# Patient Record
Sex: FEMALE | Race: WHITE | NOT HISPANIC OR LATINO | URBAN - METROPOLITAN AREA
[De-identification: names, ages, dates, MRNs, and addresses within clinical notes are randomized per-mention and may not be internally consistent; named-entity substitution may affect disease eponyms.]

---

## 2017-11-25 ENCOUNTER — INPATIENT (INPATIENT)
Facility: HOSPITAL | Age: 22
LOS: 1 days | Discharge: HOME | End: 2017-11-27
Attending: OBSTETRICS & GYNECOLOGY | Admitting: OBSTETRICS & GYNECOLOGY
Payer: COMMERCIAL

## 2017-11-25 VITALS
OXYGEN SATURATION: 100 % | TEMPERATURE: 98 F | DIASTOLIC BLOOD PRESSURE: 54 MMHG | HEART RATE: 79 BPM | SYSTOLIC BLOOD PRESSURE: 117 MMHG | RESPIRATION RATE: 16 BRPM

## 2017-11-25 DIAGNOSIS — Z3A.00 WEEKS OF GESTATION OF PREGNANCY NOT SPECIFIED: ICD-10-CM

## 2017-11-25 DIAGNOSIS — O26.899 OTHER SPECIFIED PREGNANCY RELATED CONDITIONS, UNSPECIFIED TRIMESTER: ICD-10-CM

## 2017-11-25 DIAGNOSIS — Z34.80 ENCOUNTER FOR SUPERVISION OF OTHER NORMAL PREGNANCY, UNSPECIFIED TRIMESTER: ICD-10-CM

## 2017-11-25 LAB
BASOPHILS # BLD AUTO: 0 K/UL — SIGNIFICANT CHANGE UP (ref 0–0.2)
BASOPHILS NFR BLD AUTO: 0.2 % — SIGNIFICANT CHANGE UP (ref 0–2)
EOSINOPHIL # BLD AUTO: 0 K/UL — SIGNIFICANT CHANGE UP (ref 0–0.5)
EOSINOPHIL NFR BLD AUTO: 0.3 % — SIGNIFICANT CHANGE UP (ref 0–6)
HCT VFR BLD CALC: 33.3 % — LOW (ref 34.5–45)
HGB BLD-MCNC: 11.5 G/DL — SIGNIFICANT CHANGE UP (ref 11.5–15.5)
LYMPHOCYTES # BLD AUTO: 15.2 % — SIGNIFICANT CHANGE UP (ref 13–44)
LYMPHOCYTES # BLD AUTO: 2.5 K/UL — SIGNIFICANT CHANGE UP (ref 1–3.3)
MCHC RBC-ENTMCNC: 33.1 PG — SIGNIFICANT CHANGE UP (ref 27–34)
MCHC RBC-ENTMCNC: 34.5 GM/DL — SIGNIFICANT CHANGE UP (ref 32–36)
MCV RBC AUTO: 96 FL — SIGNIFICANT CHANGE UP (ref 80–100)
MONOCYTES # BLD AUTO: 0.8 K/UL — SIGNIFICANT CHANGE UP (ref 0–0.9)
MONOCYTES NFR BLD AUTO: 5.2 % — SIGNIFICANT CHANGE UP (ref 2–14)
NEUTROPHILS # BLD AUTO: 13 K/UL — HIGH (ref 1.8–7.4)
NEUTROPHILS NFR BLD AUTO: 79.2 % — HIGH (ref 43–77)
PLATELET # BLD AUTO: 244 K/UL — SIGNIFICANT CHANGE UP (ref 150–400)
RBC # BLD: 3.47 M/UL — LOW (ref 3.8–5.2)
RBC # FLD: 12 % — SIGNIFICANT CHANGE UP (ref 10.3–14.5)
T PALLIDUM AB TITR SER: NEGATIVE — SIGNIFICANT CHANGE UP
WBC # BLD: 16.5 K/UL — HIGH (ref 3.8–10.5)
WBC # FLD AUTO: 16.5 K/UL — HIGH (ref 3.8–10.5)

## 2017-11-25 RX ORDER — SODIUM CHLORIDE 9 MG/ML
1000 INJECTION, SOLUTION INTRAVENOUS
Qty: 0 | Refills: 0 | Status: DISCONTINUED | OUTPATIENT
Start: 2017-11-25 | End: 2017-11-25

## 2017-11-25 RX ORDER — IBUPROFEN 200 MG
600 TABLET ORAL EVERY 6 HOURS
Qty: 0 | Refills: 0 | Status: COMPLETED | OUTPATIENT
Start: 2017-11-25 | End: 2018-10-24

## 2017-11-25 RX ORDER — SIMETHICONE 80 MG/1
80 TABLET, CHEWABLE ORAL EVERY 6 HOURS
Qty: 0 | Refills: 0 | Status: DISCONTINUED | OUTPATIENT
Start: 2017-11-25 | End: 2017-11-27

## 2017-11-25 RX ORDER — ACETAMINOPHEN 500 MG
975 TABLET ORAL EVERY 6 HOURS
Qty: 0 | Refills: 0 | Status: DISCONTINUED | OUTPATIENT
Start: 2017-11-25 | End: 2017-11-27

## 2017-11-25 RX ORDER — PRAMOXINE HYDROCHLORIDE 150 MG/15G
1 AEROSOL, FOAM RECTAL EVERY 4 HOURS
Qty: 0 | Refills: 0 | Status: DISCONTINUED | OUTPATIENT
Start: 2017-11-25 | End: 2017-11-25

## 2017-11-25 RX ORDER — DIPHENHYDRAMINE HCL 50 MG
25 CAPSULE ORAL EVERY 6 HOURS
Qty: 0 | Refills: 0 | Status: DISCONTINUED | OUTPATIENT
Start: 2017-11-25 | End: 2017-11-27

## 2017-11-25 RX ORDER — CITRIC ACID/SODIUM CITRATE 300-500 MG
15 SOLUTION, ORAL ORAL EVERY 4 HOURS
Qty: 0 | Refills: 0 | Status: DISCONTINUED | OUTPATIENT
Start: 2017-11-25 | End: 2017-11-25

## 2017-11-25 RX ORDER — DIBUCAINE 1 %
1 OINTMENT (GRAM) RECTAL EVERY 4 HOURS
Qty: 0 | Refills: 0 | Status: DISCONTINUED | OUTPATIENT
Start: 2017-11-25 | End: 2017-11-25

## 2017-11-25 RX ORDER — SODIUM CHLORIDE 9 MG/ML
1000 INJECTION, SOLUTION INTRAVENOUS ONCE
Qty: 0 | Refills: 0 | Status: DISCONTINUED | OUTPATIENT
Start: 2017-11-25 | End: 2017-11-25

## 2017-11-25 RX ORDER — SODIUM CHLORIDE 9 MG/ML
3 INJECTION INTRAMUSCULAR; INTRAVENOUS; SUBCUTANEOUS EVERY 8 HOURS
Qty: 0 | Refills: 0 | Status: DISCONTINUED | OUTPATIENT
Start: 2017-11-25 | End: 2017-11-27

## 2017-11-25 RX ORDER — OXYTOCIN 10 UNIT/ML
333.33 VIAL (ML) INJECTION
Qty: 20 | Refills: 0 | Status: DISCONTINUED | OUTPATIENT
Start: 2017-11-25 | End: 2017-11-25

## 2017-11-25 RX ORDER — SODIUM CHLORIDE 9 MG/ML
3 INJECTION INTRAMUSCULAR; INTRAVENOUS; SUBCUTANEOUS EVERY 8 HOURS
Qty: 0 | Refills: 0 | Status: DISCONTINUED | OUTPATIENT
Start: 2017-11-25 | End: 2017-11-25

## 2017-11-25 RX ORDER — OXYTOCIN 10 UNIT/ML
41.67 VIAL (ML) INJECTION
Qty: 20 | Refills: 0 | Status: DISCONTINUED | OUTPATIENT
Start: 2017-11-25 | End: 2017-11-25

## 2017-11-25 RX ORDER — LANOLIN
1 OINTMENT (GRAM) TOPICAL EVERY 6 HOURS
Qty: 0 | Refills: 0 | Status: DISCONTINUED | OUTPATIENT
Start: 2017-11-25 | End: 2017-11-27

## 2017-11-25 RX ORDER — OXYCODONE HYDROCHLORIDE 5 MG/1
5 TABLET ORAL EVERY 4 HOURS
Qty: 0 | Refills: 0 | Status: DISCONTINUED | OUTPATIENT
Start: 2017-11-25 | End: 2017-11-27

## 2017-11-25 RX ORDER — PRAMOXINE HYDROCHLORIDE 150 MG/15G
1 AEROSOL, FOAM RECTAL EVERY 4 HOURS
Qty: 0 | Refills: 0 | Status: DISCONTINUED | OUTPATIENT
Start: 2017-11-25 | End: 2017-11-27

## 2017-11-25 RX ORDER — AER TRAVELER 0.5 G/1
1 SOLUTION RECTAL; TOPICAL EVERY 4 HOURS
Qty: 0 | Refills: 0 | Status: DISCONTINUED | OUTPATIENT
Start: 2017-11-25 | End: 2017-11-25

## 2017-11-25 RX ORDER — IBUPROFEN 200 MG
600 TABLET ORAL EVERY 6 HOURS
Qty: 0 | Refills: 0 | Status: DISCONTINUED | OUTPATIENT
Start: 2017-11-25 | End: 2017-11-27

## 2017-11-25 RX ORDER — HYDROCORTISONE 1 %
1 OINTMENT (GRAM) TOPICAL EVERY 4 HOURS
Qty: 0 | Refills: 0 | Status: DISCONTINUED | OUTPATIENT
Start: 2017-11-25 | End: 2017-11-25

## 2017-11-25 RX ORDER — OXYTOCIN 10 UNIT/ML
41.67 VIAL (ML) INJECTION
Qty: 20 | Refills: 0 | Status: DISCONTINUED | OUTPATIENT
Start: 2017-11-25 | End: 2017-11-27

## 2017-11-25 RX ORDER — OXYCODONE HYDROCHLORIDE 5 MG/1
5 TABLET ORAL
Qty: 0 | Refills: 0 | Status: DISCONTINUED | OUTPATIENT
Start: 2017-11-25 | End: 2017-11-27

## 2017-11-25 RX ORDER — TETANUS TOXOID, REDUCED DIPHTHERIA TOXOID AND ACELLULAR PERTUSSIS VACCINE, ADSORBED 5; 2.5; 8; 8; 2.5 [IU]/.5ML; [IU]/.5ML; UG/.5ML; UG/.5ML; UG/.5ML
0.5 SUSPENSION INTRAMUSCULAR ONCE
Qty: 0 | Refills: 0 | Status: DISCONTINUED | OUTPATIENT
Start: 2017-11-25 | End: 2017-11-27

## 2017-11-25 RX ORDER — MAGNESIUM HYDROXIDE 400 MG/1
30 TABLET, CHEWABLE ORAL
Qty: 0 | Refills: 0 | Status: DISCONTINUED | OUTPATIENT
Start: 2017-11-25 | End: 2017-11-27

## 2017-11-25 RX ORDER — AER TRAVELER 0.5 G/1
1 SOLUTION RECTAL; TOPICAL EVERY 4 HOURS
Qty: 0 | Refills: 0 | Status: DISCONTINUED | OUTPATIENT
Start: 2017-11-25 | End: 2017-11-27

## 2017-11-25 RX ORDER — KETOROLAC TROMETHAMINE 30 MG/ML
30 SYRINGE (ML) INJECTION ONCE
Qty: 0 | Refills: 0 | Status: DISCONTINUED | OUTPATIENT
Start: 2017-11-25 | End: 2017-11-25

## 2017-11-25 RX ORDER — HYDROCORTISONE 1 %
1 OINTMENT (GRAM) TOPICAL EVERY 4 HOURS
Qty: 0 | Refills: 0 | Status: DISCONTINUED | OUTPATIENT
Start: 2017-11-25 | End: 2017-11-27

## 2017-11-25 RX ORDER — GLYCERIN ADULT
1 SUPPOSITORY, RECTAL RECTAL AT BEDTIME
Qty: 0 | Refills: 0 | Status: DISCONTINUED | OUTPATIENT
Start: 2017-11-25 | End: 2017-11-27

## 2017-11-25 RX ORDER — ACETAMINOPHEN 500 MG
975 TABLET ORAL EVERY 6 HOURS
Qty: 0 | Refills: 0 | Status: COMPLETED | OUTPATIENT
Start: 2017-11-25 | End: 2018-10-24

## 2017-11-25 RX ORDER — DOCUSATE SODIUM 100 MG
100 CAPSULE ORAL
Qty: 0 | Refills: 0 | Status: DISCONTINUED | OUTPATIENT
Start: 2017-11-25 | End: 2017-11-27

## 2017-11-25 RX ORDER — DIBUCAINE 1 %
1 OINTMENT (GRAM) RECTAL EVERY 4 HOURS
Qty: 0 | Refills: 0 | Status: DISCONTINUED | OUTPATIENT
Start: 2017-11-25 | End: 2017-11-27

## 2017-11-25 RX ADMIN — Medication 30 MILLIGRAM(S): at 13:55

## 2017-11-25 RX ADMIN — Medication 125 MILLIUNIT(S)/MIN: at 13:57

## 2017-11-26 ENCOUNTER — TRANSCRIPTION ENCOUNTER (OUTPATIENT)
Age: 22
End: 2017-11-26

## 2017-11-26 LAB
HCT VFR BLD CALC: 30.2 % — LOW (ref 34.5–45)
HGB BLD-MCNC: 9.6 G/DL — LOW (ref 11.5–15.5)

## 2017-11-26 RX ORDER — DOCUSATE SODIUM 100 MG
1 CAPSULE ORAL
Qty: 0 | Refills: 0 | DISCHARGE
Start: 2017-11-26

## 2017-11-26 RX ORDER — ACETAMINOPHEN 500 MG
3 TABLET ORAL
Qty: 0 | Refills: 0 | COMMUNITY
Start: 2017-11-26

## 2017-11-26 RX ORDER — IBUPROFEN 200 MG
1 TABLET ORAL
Qty: 0 | Refills: 0 | COMMUNITY
Start: 2017-11-26

## 2017-11-26 RX ADMIN — Medication 600 MILLIGRAM(S): at 04:10

## 2017-11-26 RX ADMIN — Medication 600 MILLIGRAM(S): at 23:01

## 2017-11-26 RX ADMIN — Medication 600 MILLIGRAM(S): at 16:56

## 2017-11-26 RX ADMIN — Medication 600 MILLIGRAM(S): at 09:30

## 2017-11-26 RX ADMIN — Medication 600 MILLIGRAM(S): at 03:10

## 2017-11-26 RX ADMIN — Medication 1 TABLET(S): at 11:24

## 2017-11-26 RX ADMIN — Medication 600 MILLIGRAM(S): at 08:49

## 2017-11-26 RX ADMIN — Medication 600 MILLIGRAM(S): at 17:30

## 2017-11-26 RX ADMIN — Medication 600 MILLIGRAM(S): at 22:31

## 2017-11-26 RX ADMIN — Medication 100 MILLIGRAM(S): at 22:33

## 2017-11-26 NOTE — DISCHARGE NOTE OB - PATIENT PORTAL LINK FT
“You can access the FollowHealth Patient Portal, offered by [facility], by registering with the following website: [email or web address]”

## 2017-11-26 NOTE — DISCHARGE NOTE OB - MEDICATION SUMMARY - MEDICATIONS TO TAKE
I will START or STAY ON the medications listed below when I get home from the hospital:    acetaminophen 325 mg oral tablet  -- 3 tab(s) by mouth every 6 hours  -- Indication: For pain    ibuprofen 600 mg oral tablet  -- 1 tab(s) by mouth every 6 hours  -- Indication: For pain    docusate sodium 100 mg oral capsule  -- 1 cap(s) by mouth 2 times a day, As needed, Stool Softening  -- Indication: For constipation

## 2017-11-26 NOTE — PROGRESS NOTE ADULT - SUBJECTIVE AND OBJECTIVE BOX
Postpartum Note- PPD#1    Allergies    No Known Allergies    Intolerances      Rubella Immune  RPR Negative  Blood Type  B  --  Negative    Patient w/o complaints, pain is controlled.    Pt is OOB, tolerating PO, passing flatus. Lochia WNL.     O:  Vital Signs Last 24 Hrs  T(C): 36.7 (26 Nov 2017 05:32), Max: 36.9 (25 Nov 2017 13:30)  T(F): 98 (26 Nov 2017 05:32), Max: 98.4 (25 Nov 2017 13:30)  HR: 64 (26 Nov 2017 05:32) (64 - 84)  BP: 99/64 (26 Nov 2017 05:32) (94/66 - 117/54)  BP(mean): --  RR: 18 (26 Nov 2017 05:32) (15 - 20)  SpO2: 98% (25 Nov 2017 17:20) (98% - 100%)     Gen: NAD  Abdomen: Soft, nontender, non-distended, fundus firm.  Lochia WNL  Ext: Neg edema, Neg calf tenderness    LABS:               9.6    x     )-----------( x        ( 11-26 @ 06:26 )             30.2                11.5   16.5  )-----------( 244      ( 11-25 @ 14:40 )             33.3

## 2017-11-26 NOTE — DISCHARGE NOTE OB - MEDICATION SUMMARY - MEDICATIONS TO STOP TAKING
I will STOP taking the medications listed below when I get home from the hospital:    acetaminophen 325 mg oral tablet  -- 3 tab(s) by mouth every 6 hours    ibuprofen 600 mg oral tablet  -- 1 tab(s) by mouth every 6 hours    oxyCODONE 5 mg oral tablet  -- 1 tab(s) by mouth every 3 hours    oxyCODONE 5 mg oral tablet  -- 1 tab(s) by mouth every 4 hours, As needed, Severe Pain

## 2017-11-26 NOTE — DISCHARGE NOTE OB - CARE PLAN
Principal Discharge DX:	Vaginal delivery  Goal:	recovery  Instructions for follow-up, activity and diet:	Follow up in the office in 6 weeks for your pp visit.

## 2017-11-26 NOTE — DISCHARGE NOTE OB - MATERIALS PROVIDED
Breastfeeding Guide and Packet/Breastfeeding Mother’s Support Group Information/[facility]  Screening Program/Shaken Baby Prevention Handout/Birth Certificate Instructions/[facility] Hearing Screen Program/Guide to Postpartum Care/Back To Sleep Handout/Breastfeeding Log/Discharge Medication Information for Patients and Families Pocket Guide

## 2017-11-26 NOTE — DISCHARGE NOTE OB - CARE PROVIDER_API CALL
Arnold Smith), Obstetrics and Gynecology  [address] [place] [address]  Phone: [phone]  Fax: [phone]

## 2017-11-27 VITALS
SYSTOLIC BLOOD PRESSURE: 89 MMHG | HEART RATE: 70 BPM | DIASTOLIC BLOOD PRESSURE: 58 MMHG | TEMPERATURE: 98 F | RESPIRATION RATE: 18 BRPM

## 2017-11-27 PROCEDURE — 59025 FETAL NON-STRESS TEST: CPT

## 2017-11-27 PROCEDURE — G0463: CPT

## 2017-11-27 PROCEDURE — 59050 FETAL MONITOR W/REPORT: CPT

## 2017-11-27 PROCEDURE — 86901 BLOOD TYPING SEROLOGIC RH(D): CPT

## 2017-11-27 PROCEDURE — 86900 BLOOD TYPING SEROLOGIC ABO: CPT

## 2017-11-27 PROCEDURE — 85027 COMPLETE CBC AUTOMATED: CPT

## 2017-11-27 PROCEDURE — 85018 HEMOGLOBIN: CPT

## 2017-11-27 PROCEDURE — 86850 RBC ANTIBODY SCREEN: CPT

## 2017-11-27 PROCEDURE — 86780 TREPONEMA PALLIDUM: CPT

## 2017-11-27 RX ADMIN — Medication 600 MILLIGRAM(S): at 11:47

## 2017-11-27 RX ADMIN — Medication 600 MILLIGRAM(S): at 11:17

## 2017-11-27 RX ADMIN — Medication 600 MILLIGRAM(S): at 05:22

## 2017-11-27 RX ADMIN — Medication 975 MILLIGRAM(S): at 09:15

## 2017-11-27 RX ADMIN — Medication 975 MILLIGRAM(S): at 01:52

## 2017-11-27 RX ADMIN — Medication 975 MILLIGRAM(S): at 08:45

## 2017-11-27 RX ADMIN — Medication 975 MILLIGRAM(S): at 01:22

## 2017-11-27 NOTE — PROGRESS NOTE ADULT - SUBJECTIVE AND OBJECTIVE BOX
PPD#2- ATTENDING NOTE    S: Patient doing well. Minimal lochia. Pain controlled.    O: Vital Signs Last 24 Hrs  T(C): 36.7 (27 Nov 2017 06:30), Max: 36.7 (26 Nov 2017 17:33)  T(F): 98 (27 Nov 2017 06:30), Max: 98 (26 Nov 2017 17:33)  HR: 70 (27 Nov 2017 06:30) (54 - 81)  BP: 89/58 (27 Nov 2017 06:30) (89/57 - 90/60)  BP(mean): --  RR: 18 (27 Nov 2017 06:30) (18 - 18)  SpO2: 98% (26 Nov 2017 17:33) (98% - 98%)    Gen: NAD  Abd: soft, NT, ND, fundus firm below umbilicus  Lochia: moderate  Ext: no tenderness, no hyper reflexia    Labs:                        9.6    x     )-----------( x        ( 26 Nov 2017 06:26 )             30.2

## 2019-07-19 PROBLEM — Z00.00 ENCOUNTER FOR PREVENTIVE HEALTH EXAMINATION: Status: ACTIVE | Noted: 2019-07-19

## 2019-07-22 ENCOUNTER — APPOINTMENT (OUTPATIENT)
Dept: ANTEPARTUM | Facility: CLINIC | Age: 24
End: 2019-07-22

## 2019-07-24 ENCOUNTER — APPOINTMENT (OUTPATIENT)
Dept: ANTEPARTUM | Facility: CLINIC | Age: 24
End: 2019-07-24

## 2019-07-24 ENCOUNTER — ASOB RESULT (OUTPATIENT)
Age: 24
End: 2019-07-24

## 2019-07-24 ENCOUNTER — TRANSCRIPTION ENCOUNTER (OUTPATIENT)
Age: 24
End: 2019-07-24

## 2019-07-24 ENCOUNTER — APPOINTMENT (OUTPATIENT)
Dept: ANTEPARTUM | Facility: CLINIC | Age: 24
End: 2019-07-24
Payer: COMMERCIAL

## 2019-07-24 PROCEDURE — 76805 OB US >/= 14 WKS SNGL FETUS: CPT

## 2019-09-26 ENCOUNTER — OUTPATIENT (OUTPATIENT)
Dept: OUTPATIENT SERVICES | Facility: HOSPITAL | Age: 24
LOS: 1 days | End: 2019-09-26
Payer: COMMERCIAL

## 2019-09-26 DIAGNOSIS — O26.899 OTHER SPECIFIED PREGNANCY RELATED CONDITIONS, UNSPECIFIED TRIMESTER: ICD-10-CM

## 2019-09-26 DIAGNOSIS — Z3A.00 WEEKS OF GESTATION OF PREGNANCY NOT SPECIFIED: ICD-10-CM

## 2019-09-26 PROCEDURE — 59025 FETAL NON-STRESS TEST: CPT | Mod: 26

## 2019-09-26 PROCEDURE — 59025 FETAL NON-STRESS TEST: CPT

## 2019-09-26 PROCEDURE — G0463: CPT

## 2019-11-22 ENCOUNTER — OUTPATIENT (OUTPATIENT)
Dept: OUTPATIENT SERVICES | Facility: HOSPITAL | Age: 24
LOS: 1 days | End: 2019-11-22
Payer: COMMERCIAL

## 2019-11-22 DIAGNOSIS — Z34.80 ENCOUNTER FOR SUPERVISION OF OTHER NORMAL PREGNANCY, UNSPECIFIED TRIMESTER: ICD-10-CM

## 2019-11-22 DIAGNOSIS — Z3A.00 WEEKS OF GESTATION OF PREGNANCY NOT SPECIFIED: ICD-10-CM

## 2019-11-22 DIAGNOSIS — O26.899 OTHER SPECIFIED PREGNANCY RELATED CONDITIONS, UNSPECIFIED TRIMESTER: ICD-10-CM

## 2019-11-22 LAB
ANTIBODY ID 1_1: SIGNIFICANT CHANGE UP
BASOPHILS # BLD AUTO: 0.01 K/UL — SIGNIFICANT CHANGE UP (ref 0–0.2)
BASOPHILS NFR BLD AUTO: 0.1 % — SIGNIFICANT CHANGE UP (ref 0–2)
BLD GP AB SCN SERPL QL: POSITIVE — SIGNIFICANT CHANGE UP
EOSINOPHIL # BLD AUTO: 0.04 K/UL — SIGNIFICANT CHANGE UP (ref 0–0.5)
EOSINOPHIL NFR BLD AUTO: 0.4 % — SIGNIFICANT CHANGE UP (ref 0–6)
HCT VFR BLD CALC: 36.6 % — SIGNIFICANT CHANGE UP (ref 34.5–45)
HGB BLD-MCNC: 11.6 G/DL — SIGNIFICANT CHANGE UP (ref 11.5–15.5)
IMM GRANULOCYTES NFR BLD AUTO: 0.3 % — SIGNIFICANT CHANGE UP (ref 0–1.5)
LYMPHOCYTES # BLD AUTO: 1.84 K/UL — SIGNIFICANT CHANGE UP (ref 1–3.3)
LYMPHOCYTES # BLD AUTO: 19.5 % — SIGNIFICANT CHANGE UP (ref 13–44)
MCHC RBC-ENTMCNC: 30.7 PG — SIGNIFICANT CHANGE UP (ref 27–34)
MCHC RBC-ENTMCNC: 31.7 GM/DL — LOW (ref 32–36)
MCV RBC AUTO: 96.8 FL — SIGNIFICANT CHANGE UP (ref 80–100)
MONOCYTES # BLD AUTO: 0.51 K/UL — SIGNIFICANT CHANGE UP (ref 0–0.9)
MONOCYTES NFR BLD AUTO: 5.4 % — SIGNIFICANT CHANGE UP (ref 2–14)
NEUTROPHILS # BLD AUTO: 6.99 K/UL — SIGNIFICANT CHANGE UP (ref 1.8–7.4)
NEUTROPHILS NFR BLD AUTO: 74.3 % — SIGNIFICANT CHANGE UP (ref 43–77)
NRBC # BLD: 0 /100 WBCS — SIGNIFICANT CHANGE UP (ref 0–0)
PLATELET # BLD AUTO: 229 K/UL — SIGNIFICANT CHANGE UP (ref 150–400)
RBC # BLD: 3.78 M/UL — LOW (ref 3.8–5.2)
RBC # FLD: 14 % — SIGNIFICANT CHANGE UP (ref 10.3–14.5)
RH IG SCN BLD-IMP: NEGATIVE — SIGNIFICANT CHANGE UP
WBC # BLD: 9.42 K/UL — SIGNIFICANT CHANGE UP (ref 3.8–10.5)
WBC # FLD AUTO: 9.42 K/UL — SIGNIFICANT CHANGE UP (ref 3.8–10.5)

## 2019-11-22 PROCEDURE — 86870 RBC ANTIBODY IDENTIFICATION: CPT

## 2019-11-22 PROCEDURE — G0463: CPT

## 2019-11-22 PROCEDURE — 85027 COMPLETE CBC AUTOMATED: CPT

## 2019-11-22 PROCEDURE — 86850 RBC ANTIBODY SCREEN: CPT

## 2019-11-22 PROCEDURE — 86900 BLOOD TYPING SEROLOGIC ABO: CPT

## 2019-11-22 PROCEDURE — 86780 TREPONEMA PALLIDUM: CPT

## 2019-11-22 PROCEDURE — 86901 BLOOD TYPING SEROLOGIC RH(D): CPT

## 2019-11-22 PROCEDURE — 59025 FETAL NON-STRESS TEST: CPT

## 2019-11-22 RX ORDER — OXYTOCIN 10 UNIT/ML
333.33 VIAL (ML) INJECTION
Qty: 20 | Refills: 0 | Status: DISCONTINUED | OUTPATIENT
Start: 2019-11-22 | End: 2019-11-23

## 2019-11-22 RX ORDER — SODIUM CHLORIDE 9 MG/ML
1000 INJECTION, SOLUTION INTRAVENOUS
Refills: 0 | Status: DISCONTINUED | OUTPATIENT
Start: 2019-11-22 | End: 2019-11-23

## 2019-11-22 RX ORDER — CITRIC ACID/SODIUM CITRATE 300-500 MG
15 SOLUTION, ORAL ORAL EVERY 6 HOURS
Refills: 0 | Status: DISCONTINUED | OUTPATIENT
Start: 2019-11-22 | End: 2019-11-23

## 2019-11-22 RX ORDER — MORPHINE SULFATE 50 MG/1
2 CAPSULE, EXTENDED RELEASE ORAL ONCE
Refills: 0 | Status: DISCONTINUED | OUTPATIENT
Start: 2019-11-22 | End: 2019-11-23

## 2019-11-23 LAB — T PALLIDUM AB TITR SER: NEGATIVE — SIGNIFICANT CHANGE UP

## 2019-12-03 ENCOUNTER — OUTPATIENT (OUTPATIENT)
Dept: OUTPATIENT SERVICES | Facility: HOSPITAL | Age: 24
LOS: 1 days | End: 2019-12-03
Payer: COMMERCIAL

## 2019-12-03 DIAGNOSIS — Z3A.00 WEEKS OF GESTATION OF PREGNANCY NOT SPECIFIED: ICD-10-CM

## 2019-12-03 DIAGNOSIS — O26.899 OTHER SPECIFIED PREGNANCY RELATED CONDITIONS, UNSPECIFIED TRIMESTER: ICD-10-CM

## 2019-12-03 PROCEDURE — 59025 FETAL NON-STRESS TEST: CPT | Mod: 26

## 2019-12-04 PROCEDURE — 59025 FETAL NON-STRESS TEST: CPT

## 2019-12-04 PROCEDURE — G0463: CPT

## 2019-12-13 ENCOUNTER — INPATIENT (INPATIENT)
Facility: HOSPITAL | Age: 24
LOS: 1 days | Discharge: HOME | End: 2019-12-15
Attending: OBSTETRICS & GYNECOLOGY | Admitting: OBSTETRICS & GYNECOLOGY
Payer: COMMERCIAL

## 2019-12-13 VITALS — HEIGHT: 62 IN | WEIGHT: 121.92 LBS

## 2019-12-13 DIAGNOSIS — Z34.80 ENCOUNTER FOR SUPERVISION OF OTHER NORMAL PREGNANCY, UNSPECIFIED TRIMESTER: ICD-10-CM

## 2019-12-13 DIAGNOSIS — O26.899 OTHER SPECIFIED PREGNANCY RELATED CONDITIONS, UNSPECIFIED TRIMESTER: ICD-10-CM

## 2019-12-13 DIAGNOSIS — Z3A.00 WEEKS OF GESTATION OF PREGNANCY NOT SPECIFIED: ICD-10-CM

## 2019-12-13 LAB
BASOPHILS # BLD AUTO: 0.01 K/UL — SIGNIFICANT CHANGE UP (ref 0–0.2)
BASOPHILS NFR BLD AUTO: 0.1 % — SIGNIFICANT CHANGE UP (ref 0–2)
BLD GP AB SCN SERPL QL: NEGATIVE — SIGNIFICANT CHANGE UP
EOSINOPHIL # BLD AUTO: 0.04 K/UL — SIGNIFICANT CHANGE UP (ref 0–0.5)
EOSINOPHIL NFR BLD AUTO: 0.5 % — SIGNIFICANT CHANGE UP (ref 0–6)
HCT VFR BLD CALC: 36.5 % — SIGNIFICANT CHANGE UP (ref 34.5–45)
HGB BLD-MCNC: 11.3 G/DL — LOW (ref 11.5–15.5)
IMM GRANULOCYTES NFR BLD AUTO: 0.5 % — SIGNIFICANT CHANGE UP (ref 0–1.5)
LYMPHOCYTES # BLD AUTO: 1.45 K/UL — SIGNIFICANT CHANGE UP (ref 1–3.3)
LYMPHOCYTES # BLD AUTO: 18.5 % — SIGNIFICANT CHANGE UP (ref 13–44)
MCHC RBC-ENTMCNC: 30.7 PG — SIGNIFICANT CHANGE UP (ref 27–34)
MCHC RBC-ENTMCNC: 31 GM/DL — LOW (ref 32–36)
MCV RBC AUTO: 99.2 FL — SIGNIFICANT CHANGE UP (ref 80–100)
MONOCYTES # BLD AUTO: 0.44 K/UL — SIGNIFICANT CHANGE UP (ref 0–0.9)
MONOCYTES NFR BLD AUTO: 5.6 % — SIGNIFICANT CHANGE UP (ref 2–14)
NEUTROPHILS # BLD AUTO: 5.84 K/UL — SIGNIFICANT CHANGE UP (ref 1.8–7.4)
NEUTROPHILS NFR BLD AUTO: 74.8 % — SIGNIFICANT CHANGE UP (ref 43–77)
NRBC # BLD: 0 /100 WBCS — SIGNIFICANT CHANGE UP (ref 0–0)
PLATELET # BLD AUTO: 172 K/UL — SIGNIFICANT CHANGE UP (ref 150–400)
RBC # BLD: 3.68 M/UL — LOW (ref 3.8–5.2)
RBC # FLD: 13.8 % — SIGNIFICANT CHANGE UP (ref 10.3–14.5)
RH IG SCN BLD-IMP: NEGATIVE — SIGNIFICANT CHANGE UP
WBC # BLD: 7.82 K/UL — SIGNIFICANT CHANGE UP (ref 3.8–10.5)
WBC # FLD AUTO: 7.82 K/UL — SIGNIFICANT CHANGE UP (ref 3.8–10.5)

## 2019-12-13 RX ORDER — IBUPROFEN 200 MG
600 TABLET ORAL EVERY 6 HOURS
Refills: 0 | Status: COMPLETED | OUTPATIENT
Start: 2019-12-13 | End: 2020-11-10

## 2019-12-13 RX ORDER — SIMETHICONE 80 MG/1
80 TABLET, CHEWABLE ORAL EVERY 4 HOURS
Refills: 0 | Status: DISCONTINUED | OUTPATIENT
Start: 2019-12-13 | End: 2019-12-15

## 2019-12-13 RX ORDER — SODIUM CHLORIDE 9 MG/ML
1000 INJECTION, SOLUTION INTRAVENOUS
Refills: 0 | Status: DISCONTINUED | OUTPATIENT
Start: 2019-12-13 | End: 2019-12-13

## 2019-12-13 RX ORDER — DIPHENHYDRAMINE HCL 50 MG
25 CAPSULE ORAL EVERY 6 HOURS
Refills: 0 | Status: DISCONTINUED | OUTPATIENT
Start: 2019-12-13 | End: 2019-12-15

## 2019-12-13 RX ORDER — LANOLIN
1 OINTMENT (GRAM) TOPICAL EVERY 6 HOURS
Refills: 0 | Status: DISCONTINUED | OUTPATIENT
Start: 2019-12-13 | End: 2019-12-15

## 2019-12-13 RX ORDER — AER TRAVELER 0.5 G/1
1 SOLUTION RECTAL; TOPICAL EVERY 4 HOURS
Refills: 0 | Status: DISCONTINUED | OUTPATIENT
Start: 2019-12-13 | End: 2019-12-15

## 2019-12-13 RX ORDER — OXYTOCIN 10 UNIT/ML
333.33 VIAL (ML) INJECTION
Qty: 20 | Refills: 0 | Status: DISCONTINUED | OUTPATIENT
Start: 2019-12-13 | End: 2019-12-15

## 2019-12-13 RX ORDER — CITRIC ACID/SODIUM CITRATE 300-500 MG
15 SOLUTION, ORAL ORAL EVERY 6 HOURS
Refills: 0 | Status: DISCONTINUED | OUTPATIENT
Start: 2019-12-13 | End: 2019-12-13

## 2019-12-13 RX ORDER — HYDROCORTISONE 1 %
1 OINTMENT (GRAM) TOPICAL EVERY 6 HOURS
Refills: 0 | Status: DISCONTINUED | OUTPATIENT
Start: 2019-12-13 | End: 2019-12-15

## 2019-12-13 RX ORDER — IBUPROFEN 200 MG
600 TABLET ORAL EVERY 6 HOURS
Refills: 0 | Status: DISCONTINUED | OUTPATIENT
Start: 2019-12-13 | End: 2019-12-15

## 2019-12-13 RX ORDER — DIBUCAINE 1 %
1 OINTMENT (GRAM) RECTAL EVERY 6 HOURS
Refills: 0 | Status: DISCONTINUED | OUTPATIENT
Start: 2019-12-13 | End: 2019-12-15

## 2019-12-13 RX ORDER — SODIUM CHLORIDE 9 MG/ML
3 INJECTION INTRAMUSCULAR; INTRAVENOUS; SUBCUTANEOUS EVERY 8 HOURS
Refills: 0 | Status: DISCONTINUED | OUTPATIENT
Start: 2019-12-13 | End: 2019-12-15

## 2019-12-13 RX ORDER — KETOROLAC TROMETHAMINE 30 MG/ML
30 SYRINGE (ML) INJECTION ONCE
Refills: 0 | Status: DISCONTINUED | OUTPATIENT
Start: 2019-12-13 | End: 2019-12-13

## 2019-12-13 RX ORDER — OXYCODONE HYDROCHLORIDE 5 MG/1
5 TABLET ORAL ONCE
Refills: 0 | Status: DISCONTINUED | OUTPATIENT
Start: 2019-12-13 | End: 2019-12-15

## 2019-12-13 RX ORDER — TETANUS TOXOID, REDUCED DIPHTHERIA TOXOID AND ACELLULAR PERTUSSIS VACCINE, ADSORBED 5; 2.5; 8; 8; 2.5 [IU]/.5ML; [IU]/.5ML; UG/.5ML; UG/.5ML; UG/.5ML
0.5 SUSPENSION INTRAMUSCULAR ONCE
Refills: 0 | Status: DISCONTINUED | OUTPATIENT
Start: 2019-12-13 | End: 2019-12-15

## 2019-12-13 RX ORDER — GLYCERIN ADULT
1 SUPPOSITORY, RECTAL RECTAL AT BEDTIME
Refills: 0 | Status: DISCONTINUED | OUTPATIENT
Start: 2019-12-13 | End: 2019-12-15

## 2019-12-13 RX ORDER — MAGNESIUM HYDROXIDE 400 MG/1
30 TABLET, CHEWABLE ORAL
Refills: 0 | Status: DISCONTINUED | OUTPATIENT
Start: 2019-12-13 | End: 2019-12-15

## 2019-12-13 RX ORDER — ACETAMINOPHEN 500 MG
975 TABLET ORAL
Refills: 0 | Status: DISCONTINUED | OUTPATIENT
Start: 2019-12-13 | End: 2019-12-15

## 2019-12-13 RX ORDER — BENZOCAINE 10 %
1 GEL (GRAM) MUCOUS MEMBRANE EVERY 6 HOURS
Refills: 0 | Status: DISCONTINUED | OUTPATIENT
Start: 2019-12-13 | End: 2019-12-15

## 2019-12-13 RX ORDER — OXYTOCIN 10 UNIT/ML
4 VIAL (ML) INJECTION
Qty: 30 | Refills: 0 | Status: DISCONTINUED | OUTPATIENT
Start: 2019-12-13 | End: 2019-12-13

## 2019-12-13 RX ORDER — PRAMOXINE HYDROCHLORIDE 150 MG/15G
1 AEROSOL, FOAM RECTAL EVERY 4 HOURS
Refills: 0 | Status: DISCONTINUED | OUTPATIENT
Start: 2019-12-13 | End: 2019-12-15

## 2019-12-13 RX ORDER — OXYCODONE HYDROCHLORIDE 5 MG/1
5 TABLET ORAL
Refills: 0 | Status: DISCONTINUED | OUTPATIENT
Start: 2019-12-13 | End: 2019-12-15

## 2019-12-13 RX ADMIN — Medication 4 MILLIUNIT(S)/MIN: at 15:20

## 2019-12-13 RX ADMIN — Medication 975 MILLIGRAM(S): at 21:16

## 2019-12-13 RX ADMIN — SODIUM CHLORIDE 125 MILLILITER(S): 9 INJECTION, SOLUTION INTRAVENOUS at 13:41

## 2019-12-13 RX ADMIN — Medication 975 MILLIGRAM(S): at 22:15

## 2019-12-13 RX ADMIN — Medication 1000 MILLIUNIT(S)/MIN: at 17:30

## 2019-12-13 RX ADMIN — SODIUM CHLORIDE 125 MILLILITER(S): 9 INJECTION, SOLUTION INTRAVENOUS at 13:42

## 2019-12-13 NOTE — PRE-ANESTHESIA EVALUATION ADULT - NSANTHADDINFOFT_GEN_ALL_CORE
labor epidural explained in detail; risks include but not limited to HA, failure, nv injury.  All questions answered.

## 2019-12-14 LAB
HCT VFR BLD CALC: 30.6 % — LOW (ref 34.5–45)
HGB BLD-MCNC: 9.3 G/DL — LOW (ref 11.5–15.5)
T PALLIDUM AB TITR SER: NEGATIVE — SIGNIFICANT CHANGE UP

## 2019-12-14 RX ADMIN — Medication 600 MILLIGRAM(S): at 18:44

## 2019-12-14 RX ADMIN — Medication 975 MILLIGRAM(S): at 08:37

## 2019-12-14 RX ADMIN — Medication 975 MILLIGRAM(S): at 14:57

## 2019-12-14 RX ADMIN — Medication 975 MILLIGRAM(S): at 22:05

## 2019-12-14 RX ADMIN — Medication 600 MILLIGRAM(S): at 11:35

## 2019-12-14 RX ADMIN — Medication 975 MILLIGRAM(S): at 15:30

## 2019-12-14 RX ADMIN — Medication 600 MILLIGRAM(S): at 12:30

## 2019-12-14 RX ADMIN — Medication 975 MILLIGRAM(S): at 09:30

## 2019-12-14 RX ADMIN — Medication 600 MILLIGRAM(S): at 06:30

## 2019-12-14 RX ADMIN — Medication 600 MILLIGRAM(S): at 01:00

## 2019-12-14 RX ADMIN — Medication 1 TABLET(S): at 11:36

## 2019-12-14 RX ADMIN — Medication 975 MILLIGRAM(S): at 03:36

## 2019-12-14 RX ADMIN — Medication 600 MILLIGRAM(S): at 00:03

## 2019-12-14 RX ADMIN — Medication 975 MILLIGRAM(S): at 04:24

## 2019-12-14 RX ADMIN — Medication 600 MILLIGRAM(S): at 18:17

## 2019-12-14 RX ADMIN — Medication 975 MILLIGRAM(S): at 21:09

## 2019-12-14 RX ADMIN — Medication 600 MILLIGRAM(S): at 05:39

## 2019-12-14 NOTE — PROGRESS NOTE ADULT - PROBLEM SELECTOR PLAN 1
- Pain well controlled, continue current pain regimen  - Continue ambulation, SCDs when not ambulating  - Continue regular diet  - Follow-up AM H&H. No evidence of ongoing bleeding.    Myrna Dacosta, PGY-1

## 2019-12-14 NOTE — PROGRESS NOTE ADULT - SUBJECTIVE AND OBJECTIVE BOX
OB Progress Note:  PPD#1    S: 23yo PPD#1 s/p . Patient feels well. Pain is well controlled. She is tolerating a regular diet and passing flatus. She is voiding spontaneously, and ambulating without difficulty. Endorses light vaginal bleeding, soaking less than 1 pad/hour. Denies CP/SOB. Denies lightheadedness/dizziness. Denies N/V.     O:  Vitals:  Vital Signs Last 24 Hrs  T(C): 36.4 (13 Dec 2019 20:50), Max: 37.2 (13 Dec 2019 17:20)  T(F): 97.6 (13 Dec 2019 20:50), Max: 99 (13 Dec 2019 17:20)  HR: 79 (13 Dec 2019 20:50) (60 - 81)  BP: 109/72 (13 Dec 2019 20:50) (100/53 - 110/61)  BP(mean): 76 (13 Dec 2019 19:25) (76 - 79)  RR: 18 (13 Dec 2019 20:50) (14 - 18)  SpO2: 98% (13 Dec 2019 20:50) (98% - 99%)    Physical Exam:  General: NAD  Heart: all extremities well perfused  Lungs: breathing comfortably  Abdomen: soft, non-tender, non-distended, fundus firm  Extremities: No calf tenderness or erythema    Labs:  Blood type: B Negative  Rubella IgG: RPR: Negative                          11.3<L>   7.82 >-----------< 172    ( -13 @ 14:44 )             36.5

## 2019-12-15 ENCOUNTER — TRANSCRIPTION ENCOUNTER (OUTPATIENT)
Age: 24
End: 2019-12-15

## 2019-12-15 VITALS
RESPIRATION RATE: 18 BRPM | SYSTOLIC BLOOD PRESSURE: 109 MMHG | OXYGEN SATURATION: 97 % | HEART RATE: 64 BPM | DIASTOLIC BLOOD PRESSURE: 75 MMHG | TEMPERATURE: 98 F

## 2019-12-15 PROCEDURE — 85027 COMPLETE CBC AUTOMATED: CPT

## 2019-12-15 PROCEDURE — 86901 BLOOD TYPING SEROLOGIC RH(D): CPT

## 2019-12-15 PROCEDURE — 85014 HEMATOCRIT: CPT

## 2019-12-15 PROCEDURE — 59025 FETAL NON-STRESS TEST: CPT

## 2019-12-15 PROCEDURE — 85018 HEMOGLOBIN: CPT

## 2019-12-15 PROCEDURE — 86780 TREPONEMA PALLIDUM: CPT

## 2019-12-15 PROCEDURE — 59050 FETAL MONITOR W/REPORT: CPT

## 2019-12-15 PROCEDURE — 86900 BLOOD TYPING SEROLOGIC ABO: CPT

## 2019-12-15 PROCEDURE — 86850 RBC ANTIBODY SCREEN: CPT

## 2019-12-15 PROCEDURE — G0463: CPT

## 2019-12-15 RX ADMIN — Medication 600 MILLIGRAM(S): at 12:20

## 2019-12-15 RX ADMIN — Medication 600 MILLIGRAM(S): at 06:29

## 2019-12-15 RX ADMIN — Medication 1 TABLET(S): at 11:55

## 2019-12-15 RX ADMIN — Medication 975 MILLIGRAM(S): at 04:05

## 2019-12-15 RX ADMIN — Medication 600 MILLIGRAM(S): at 11:55

## 2019-12-15 RX ADMIN — Medication 600 MILLIGRAM(S): at 01:15

## 2019-12-15 RX ADMIN — Medication 975 MILLIGRAM(S): at 03:09

## 2019-12-15 RX ADMIN — Medication 975 MILLIGRAM(S): at 08:19

## 2019-12-15 RX ADMIN — Medication 600 MILLIGRAM(S): at 07:10

## 2019-12-15 RX ADMIN — Medication 600 MILLIGRAM(S): at 00:17

## 2019-12-15 RX ADMIN — Medication 975 MILLIGRAM(S): at 09:00

## 2019-12-15 NOTE — DISCHARGE NOTE OB - PATIENT PORTAL LINK FT
You can access the FollowMyHealth Patient Portal offered by [facility] by registering at the following website: [email or web address]. By joining [employer]’s FollowMyHealth portal, you will also be able to view your health information using other applications (apps) compatible with our system.

## 2019-12-15 NOTE — DISCHARGE NOTE OB - CARE PROVIDER_API CALL
Dorothy Banda)  Obstetrics and Gynecology  [address] [place] [address]  Phone: [phone]  Fax: [phone]  Follow Up Time:

## 2019-12-15 NOTE — PROGRESS NOTE ADULT - SUBJECTIVE AND OBJECTIVE BOX
PPD#1- ATTENDING NOTE    S: Patient doing well. Minimal lochia. Pain controlled.    O: Vital Signs Last 24 Hrs  T(C): 36.6 (15 Dec 2019 05:59), Max: 36.7 (14 Dec 2019 17:38)  T(F): 97.8 (15 Dec 2019 05:59), Max: 98 (14 Dec 2019 17:38)  HR: 64 (15 Dec 2019 05:59) (64 - 77)  BP: 109/75 (15 Dec 2019 05:59) (104/69 - 109/75)  BP(mean): --  RR: 18 (15 Dec 2019 05:59) (18 - 18)  SpO2: 97% (15 Dec 2019 05:59) (97% - 97%)    Gen: NAD  Abd: soft, NT, ND, fundus firm below umbilicus  Lochia: moderate  Ext: no tenderness, no hyper reflexia,     Labs:                        9.3    x     )-----------( x        ( 14 Dec 2019 11:03 )             30.6

## 2023-04-26 ENCOUNTER — APPOINTMENT (OUTPATIENT)
Dept: ANTEPARTUM | Facility: CLINIC | Age: 28
End: 2023-04-26
Payer: MEDICAID

## 2023-04-26 ENCOUNTER — ASOB RESULT (OUTPATIENT)
Age: 28
End: 2023-04-26

## 2023-04-26 PROCEDURE — 76811 OB US DETAILED SNGL FETUS: CPT

## 2023-07-17 ENCOUNTER — APPOINTMENT (OUTPATIENT)
Dept: OBGYN | Facility: CLINIC | Age: 28
End: 2023-07-17
Payer: MEDICAID

## 2023-07-17 PROCEDURE — 76816 OB US FOLLOW-UP PER FETUS: CPT

## 2023-07-17 PROCEDURE — 76819 FETAL BIOPHYS PROFIL W/O NST: CPT | Mod: 59

## 2023-08-04 ENCOUNTER — APPOINTMENT (OUTPATIENT)
Dept: OBGYN | Facility: CLINIC | Age: 28
End: 2023-08-04
Payer: MEDICAID

## 2023-08-04 PROCEDURE — 0502F SUBSEQUENT PRENATAL CARE: CPT

## 2023-08-24 ENCOUNTER — APPOINTMENT (OUTPATIENT)
Dept: OBGYN | Facility: CLINIC | Age: 28
End: 2023-08-24
Payer: MEDICAID

## 2023-08-24 PROCEDURE — 0502F SUBSEQUENT PRENATAL CARE: CPT

## 2023-08-30 NOTE — PATIENT PROFILE OB - PATIENT REPRESENTATIVE PHONE
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    Patient reports that he has had swelling on the right side of his neck since 2020.  States that he originally had a partial excision done that year however the full excision was aborted due to bleeding.  He reports that the pathology was supposedly misplaced and never performed.    Recently has been following with ENT who performed a needle biopsy of the same location which resulted with pathology positive for necrotizing granuloma.  Pathology notes negative GMS, AFB and bacterial staining.  This was also negative for any malignancy.    Social history includes TB exposure while in school that he reports he was empirically treated with prophylactic medication for an unknown duration of time and chest x-ray/skin testing were negative.  Denies any animal exposures including rodents, rabbits, dogs or cats.  Denies any travel outside the [place].  Has never spent time in the [facility].  Has always lived here in [place].  Denies a history of mono.    Denies any fevers or chills.  Denies any acute complaints other than swelling of the right neck.  Denies any other related symptoms.    Objective   Vital Signs:  /82 (BP Location: Right arm, Patient Position: Sitting, Cuff Size: Large Adult)   Pulse 67   Temp 98.4 øF (36.9 øC) (Oral)   Resp 18   Ht 180.3 cm (71\")   Wt 117 kg (259 lb)   BMI 36.12 kg/mý   Estimated body mass index is 36.12 kg/mý as calculated from the following:    Height as of this encounter: 180.3 cm (71\").    Weight as of this encounter: 117 kg (259 lb).     Physical Exam  Constitutional:       General: He is not in acute distress.     Appearance: Normal appearance. He is normal weight. He is not ill-appearing. "   HENT:      Head: Normocephalic and atraumatic.      Comments: Right neck with prior surgical scar and proximal to this a area of swelling and nodular area without drainage or erythema.     Nose: Nose normal. No rhinorrhea.      Mouth/Throat:      Mouth: Mucous membranes are moist.      Pharynx: No oropharyngeal exudate.   Eyes:      General: No scleral icterus.     Extraocular Movements: Extraocular movements intact.      Pupils: Pupils are equal, round, and reactive to light.   Cardiovascular:      Rate and Rhythm: Normal rate and regular rhythm.      Pulses: Normal pulses.      Heart sounds: Normal heart sounds.   Pulmonary:      Effort: Pulmonary effort is normal. No respiratory distress.   Abdominal:      General: Abdomen is flat.      Palpations: Abdomen is soft.   Musculoskeletal:         General: No swelling or tenderness. Normal range of motion.      Cervical back: Normal range of motion and neck supple.      Right lower leg: No edema.      Left lower leg: No edema.   Skin:     General: Skin is warm and dry.      Findings: No rash.   Neurological:      General: No focal deficit present.      Mental Status: He is alert and oriented to person, place, and time.   Psychiatric:         Mood and Affect: Mood normal.         Behavior: Behavior normal.      Result Review :  The following data was reviewed by: Hal Gandara DO on 08/30/2023:  Common labs          4/26/2023    18:15   Common Labs   Glucose 134    BUN 12    Creatinine 0.89    Sodium 138    Potassium 4.0    Chloride 101    Calcium 9.1    Albumin 4.4    Total Bilirubin 0.5    Alkaline Phosphatase 116    AST (SGOT) 21    ALT (SGPT) 30    WBC 7.00    Hemoglobin 14.2    Hematocrit 43.0    Platelets 281      Data reviewed : Pathology report from excisional biopsy in 2020 and in 2023.  AFB, GMS and bacterial staining.             Assessment and Plan   Diagnoses and all orders for this visit:    1. 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  Given his prolonged stable presence I have a low suspicion for infectious causes.  Pathology on both fine-needle aspirate and prior excisional pathology in 2020 were both negative for any infection work-up.  Does have history of exposure to TB and excisional biopsy would be the best mechanism for diagnosis to allow for complete inspection and AFB staining with TB PCR probe.       I spent 64 minutes caring for Johnna on this date of service. This time includes time spent by me in the following activities:preparing for the visit, reviewing tests, obtaining and/or reviewing a separately obtained history, performing a medically appropriate examination and/or evaluation , counseling and educating the patient/family/caregiver, ordering medications, tests, or procedures, documenting information in the medical record, independently interpreting results and communicating that information with the patient/family/caregiver, and care coordination  Follow Up   No follow-ups on file.  Patient was given instructions and counseling regarding his condition or for health maintenance advice. Please see specific information pulled into the AVS if appropriate.          [phone]

## 2023-09-01 ENCOUNTER — APPOINTMENT (OUTPATIENT)
Dept: OBGYN | Facility: CLINIC | Age: 28
End: 2023-09-01
Payer: MEDICAID

## 2023-09-01 PROCEDURE — 0502F SUBSEQUENT PRENATAL CARE: CPT

## 2023-09-07 ENCOUNTER — APPOINTMENT (OUTPATIENT)
Dept: OBGYN | Facility: CLINIC | Age: 28
End: 2023-09-07
Payer: MEDICAID

## 2023-09-07 PROCEDURE — 0502F SUBSEQUENT PRENATAL CARE: CPT

## 2023-09-13 ENCOUNTER — TRANSCRIPTION ENCOUNTER (OUTPATIENT)
Age: 28
End: 2023-09-13

## 2023-09-13 ENCOUNTER — APPOINTMENT (OUTPATIENT)
Dept: OBGYN | Facility: CLINIC | Age: 28
End: 2023-09-13
Payer: SELF-PAY

## 2023-09-13 ENCOUNTER — INPATIENT (INPATIENT)
Facility: HOSPITAL | Age: 28
LOS: 1 days | Discharge: HOME | End: 2023-09-15
Attending: OBSTETRICS & GYNECOLOGY | Admitting: OBSTETRICS & GYNECOLOGY
Payer: MEDICAID

## 2023-09-13 VITALS — OXYGEN SATURATION: 97 % | HEART RATE: 109 BPM

## 2023-09-13 DIAGNOSIS — Z34.80 ENCOUNTER FOR SUPERVISION OF OTHER NORMAL PREGNANCY, UNSPECIFIED TRIMESTER: ICD-10-CM

## 2023-09-13 DIAGNOSIS — O26.899 OTHER SPECIFIED PREGNANCY RELATED CONDITIONS, UNSPECIFIED TRIMESTER: ICD-10-CM

## 2023-09-13 LAB
BASOPHILS # BLD AUTO: 0.01 K/UL — SIGNIFICANT CHANGE UP (ref 0–0.2)
BASOPHILS NFR BLD AUTO: 0.1 % — SIGNIFICANT CHANGE UP (ref 0–2)
BLD GP AB SCN SERPL QL: NEGATIVE — SIGNIFICANT CHANGE UP
EOSINOPHIL # BLD AUTO: 0.09 K/UL — SIGNIFICANT CHANGE UP (ref 0–0.5)
EOSINOPHIL NFR BLD AUTO: 1 % — SIGNIFICANT CHANGE UP (ref 0–6)
HCT VFR BLD CALC: 34.6 % — SIGNIFICANT CHANGE UP (ref 34.5–45)
HGB BLD-MCNC: 11.4 G/DL — LOW (ref 11.5–15.5)
IMM GRANULOCYTES NFR BLD AUTO: 0.4 % — SIGNIFICANT CHANGE UP (ref 0–0.9)
LYMPHOCYTES # BLD AUTO: 1.45 K/UL — SIGNIFICANT CHANGE UP (ref 1–3.3)
LYMPHOCYTES # BLD AUTO: 15.3 % — SIGNIFICANT CHANGE UP (ref 13–44)
MCHC RBC-ENTMCNC: 31.6 PG — SIGNIFICANT CHANGE UP (ref 27–34)
MCHC RBC-ENTMCNC: 32.9 GM/DL — SIGNIFICANT CHANGE UP (ref 32–36)
MCV RBC AUTO: 95.8 FL — SIGNIFICANT CHANGE UP (ref 80–100)
MONOCYTES # BLD AUTO: 0.55 K/UL — SIGNIFICANT CHANGE UP (ref 0–0.9)
MONOCYTES NFR BLD AUTO: 5.8 % — SIGNIFICANT CHANGE UP (ref 2–14)
NEUTROPHILS # BLD AUTO: 7.33 K/UL — SIGNIFICANT CHANGE UP (ref 1.8–7.4)
NEUTROPHILS NFR BLD AUTO: 77.4 % — HIGH (ref 43–77)
NRBC # BLD: 0 /100 WBCS — SIGNIFICANT CHANGE UP (ref 0–0)
PLATELET # BLD AUTO: 219 K/UL — SIGNIFICANT CHANGE UP (ref 150–400)
RBC # BLD: 3.61 M/UL — LOW (ref 3.8–5.2)
RBC # FLD: 13.3 % — SIGNIFICANT CHANGE UP (ref 10.3–14.5)
RH IG SCN BLD-IMP: NEGATIVE — SIGNIFICANT CHANGE UP
WBC # BLD: 9.47 K/UL — SIGNIFICANT CHANGE UP (ref 3.8–10.5)
WBC # FLD AUTO: 9.47 K/UL — SIGNIFICANT CHANGE UP (ref 3.8–10.5)

## 2023-09-13 PROCEDURE — 59425 ANTEPARTUM CARE ONLY: CPT

## 2023-09-13 PROCEDURE — 76816 OB US FOLLOW-UP PER FETUS: CPT

## 2023-09-13 PROCEDURE — 0502F SUBSEQUENT PRENATAL CARE: CPT

## 2023-09-13 PROCEDURE — 76818 FETAL BIOPHYS PROFILE W/NST: CPT | Mod: 59

## 2023-09-13 PROCEDURE — 59410 OBSTETRICAL CARE: CPT

## 2023-09-13 RX ORDER — SIMETHICONE 80 MG/1
80 TABLET, CHEWABLE ORAL EVERY 4 HOURS
Refills: 0 | Status: DISCONTINUED | OUTPATIENT
Start: 2023-09-13 | End: 2023-09-15

## 2023-09-13 RX ORDER — HYDROCORTISONE 1 %
1 OINTMENT (GRAM) TOPICAL EVERY 6 HOURS
Refills: 0 | Status: DISCONTINUED | OUTPATIENT
Start: 2023-09-13 | End: 2023-09-15

## 2023-09-13 RX ORDER — OXYTOCIN 10 UNIT/ML
333.33 VIAL (ML) INJECTION
Qty: 20 | Refills: 0 | Status: DISCONTINUED | OUTPATIENT
Start: 2023-09-13 | End: 2023-09-13

## 2023-09-13 RX ORDER — MAGNESIUM HYDROXIDE 400 MG/1
30 TABLET, CHEWABLE ORAL
Refills: 0 | Status: DISCONTINUED | OUTPATIENT
Start: 2023-09-13 | End: 2023-09-15

## 2023-09-13 RX ORDER — AER TRAVELER 0.5 G/1
1 SOLUTION RECTAL; TOPICAL EVERY 4 HOURS
Refills: 0 | Status: DISCONTINUED | OUTPATIENT
Start: 2023-09-13 | End: 2023-09-15

## 2023-09-13 RX ORDER — OXYTOCIN 10 UNIT/ML
4 VIAL (ML) INJECTION
Qty: 30 | Refills: 0 | Status: DISCONTINUED | OUTPATIENT
Start: 2023-09-13 | End: 2023-09-13

## 2023-09-13 RX ORDER — OXYTOCIN 10 UNIT/ML
41.67 VIAL (ML) INJECTION
Qty: 20 | Refills: 0 | Status: DISCONTINUED | OUTPATIENT
Start: 2023-09-13 | End: 2023-09-15

## 2023-09-13 RX ORDER — DIBUCAINE 1 %
1 OINTMENT (GRAM) RECTAL EVERY 6 HOURS
Refills: 0 | Status: DISCONTINUED | OUTPATIENT
Start: 2023-09-13 | End: 2023-09-15

## 2023-09-13 RX ORDER — BENZOCAINE 10 %
1 GEL (GRAM) MUCOUS MEMBRANE EVERY 6 HOURS
Refills: 0 | Status: DISCONTINUED | OUTPATIENT
Start: 2023-09-13 | End: 2023-09-15

## 2023-09-13 RX ORDER — IBUPROFEN 200 MG
600 TABLET ORAL EVERY 6 HOURS
Refills: 0 | Status: COMPLETED | OUTPATIENT
Start: 2023-09-13 | End: 2024-08-11

## 2023-09-13 RX ORDER — SODIUM CHLORIDE 9 MG/ML
1000 INJECTION, SOLUTION INTRAVENOUS
Refills: 0 | Status: DISCONTINUED | OUTPATIENT
Start: 2023-09-13 | End: 2023-09-13

## 2023-09-13 RX ORDER — TETANUS TOXOID, REDUCED DIPHTHERIA TOXOID AND ACELLULAR PERTUSSIS VACCINE, ADSORBED 5; 2.5; 8; 8; 2.5 [IU]/.5ML; [IU]/.5ML; UG/.5ML; UG/.5ML; UG/.5ML
0.5 SUSPENSION INTRAMUSCULAR ONCE
Refills: 0 | Status: DISCONTINUED | OUTPATIENT
Start: 2023-09-13 | End: 2023-09-15

## 2023-09-13 RX ORDER — LANOLIN
1 OINTMENT (GRAM) TOPICAL EVERY 6 HOURS
Refills: 0 | Status: DISCONTINUED | OUTPATIENT
Start: 2023-09-13 | End: 2023-09-15

## 2023-09-13 RX ORDER — KETOROLAC TROMETHAMINE 30 MG/ML
30 SYRINGE (ML) INJECTION ONCE
Refills: 0 | Status: DISCONTINUED | OUTPATIENT
Start: 2023-09-13 | End: 2023-09-13

## 2023-09-13 RX ORDER — SODIUM CHLORIDE 9 MG/ML
3 INJECTION INTRAMUSCULAR; INTRAVENOUS; SUBCUTANEOUS EVERY 8 HOURS
Refills: 0 | Status: DISCONTINUED | OUTPATIENT
Start: 2023-09-13 | End: 2023-09-15

## 2023-09-13 RX ORDER — CHLORHEXIDINE GLUCONATE 213 G/1000ML
1 SOLUTION TOPICAL DAILY
Refills: 0 | Status: DISCONTINUED | OUTPATIENT
Start: 2023-09-13 | End: 2023-09-13

## 2023-09-13 RX ORDER — DIPHENHYDRAMINE HCL 50 MG
25 CAPSULE ORAL EVERY 6 HOURS
Refills: 0 | Status: DISCONTINUED | OUTPATIENT
Start: 2023-09-13 | End: 2023-09-15

## 2023-09-13 RX ORDER — ACETAMINOPHEN 500 MG
975 TABLET ORAL
Refills: 0 | Status: DISCONTINUED | OUTPATIENT
Start: 2023-09-13 | End: 2023-09-15

## 2023-09-13 RX ORDER — CITRIC ACID/SODIUM CITRATE 300-500 MG
15 SOLUTION, ORAL ORAL EVERY 6 HOURS
Refills: 0 | Status: DISCONTINUED | OUTPATIENT
Start: 2023-09-13 | End: 2023-09-13

## 2023-09-13 RX ORDER — PRAMOXINE HYDROCHLORIDE 150 MG/15G
1 AEROSOL, FOAM RECTAL EVERY 4 HOURS
Refills: 0 | Status: DISCONTINUED | OUTPATIENT
Start: 2023-09-13 | End: 2023-09-15

## 2023-09-13 RX ORDER — OXYCODONE HYDROCHLORIDE 5 MG/1
5 TABLET ORAL ONCE
Refills: 0 | Status: DISCONTINUED | OUTPATIENT
Start: 2023-09-13 | End: 2023-09-15

## 2023-09-13 RX ORDER — OXYCODONE HYDROCHLORIDE 5 MG/1
5 TABLET ORAL
Refills: 0 | Status: DISCONTINUED | OUTPATIENT
Start: 2023-09-13 | End: 2023-09-15

## 2023-09-13 RX ADMIN — Medication 30 MILLIGRAM(S): at 21:00

## 2023-09-13 RX ADMIN — SODIUM CHLORIDE 125 MILLILITER(S): 9 INJECTION, SOLUTION INTRAVENOUS at 17:45

## 2023-09-13 RX ADMIN — Medication 4 MILLIUNIT(S)/MIN: at 18:57

## 2023-09-13 RX ADMIN — Medication 30 MILLIGRAM(S): at 20:35

## 2023-09-13 RX ADMIN — Medication 125 MILLIUNIT(S)/MIN: at 19:15

## 2023-09-13 RX ADMIN — Medication 975 MILLIGRAM(S): at 22:13

## 2023-09-13 NOTE — OB RN PATIENT PROFILE - FALL HARM RISK - UNIVERSAL INTERVENTIONS
Bed in lowest position, wheels locked, appropriate side rails in place/Call bell, personal items and telephone in reach/Instruct patient to call for assistance before getting out of bed or chair/Non-slip footwear when patient is out of bed/[place] to call system/Physically safe environment - no spills, clutter or unnecessary equipment/Purposeful Proactive Rounding/Room/bathroom lighting operational, light cord in reach

## 2023-09-13 NOTE — PRE-ANESTHESIA EVALUATION ADULT - NSANTHPMHFT_GEN_ALL_CORE
27F denies PMH. No significant CV/Pulm dz. Denies hx of asthma, HTN, bleeding d/o, back issues. No issues with prior epidurals. 27F denies PMH. No significant CV/Pulm dz. Denies hx of asthma, HTN, bleeding d/o, back issues. No issues with prior epidural for  2020

## 2023-09-13 NOTE — PRE-ANESTHESIA EVALUATION ADULT - NSANTHADDINFOFT_GEN_ALL_CORE
labor epidural explained to pt in detail;  risks include but not limited to HA, failure, nv injury. All questions answered.  Pt. requested attending MD for procedure.

## 2023-09-13 NOTE — OB PROVIDER DELIVERY SUMMARY - NSLOWPPHRISK_OBGYN_A_OB
No previous uterine incision/Small Pregnancy/Less than or equal to 4 previous vaginal births/No known bleeding disorder/No history of postpartum hemorrhage/No other PPH risks indicated Pneumonia of left lower lobe due to infectious organism

## 2023-09-13 NOTE — OB PROVIDER H&P - ASSESSMENT
26yo  EDC 23 @ 40w in early labor, GBS negative, category 1 FHT  admit  efm/toco  ivf  routine labs  augment with arom and pitocin once comfortable with epidural  anticipate   anesthesia consult  D/W Dr Raul Pang PA-C

## 2023-09-13 NOTE — OB PROVIDER DELIVERY SUMMARY - NSPROVIDERDELIVERYNOTE_OBGYN_ALL_OB_FT
Spontaneous vaginal delivery of liveborn infant girl from OA position  APGARS 8/9  Delayed cord clamping performed  Cord gases collected  Placenta delivered intact  Fundus firm  Intact cervix, vaginal walls and sulci  Intact perineum  Excellent hemostasis  Count correct x 2    gchow md

## 2023-09-13 NOTE — OB PROVIDER H&P - NSHPPHYSICALEXAM_GEN_ALL_CORE
T(C): 37.3 (09-13-23 @ 15:28), Max: 37.3 (09-13-23 @ 15:28)  HR: 113 (09-13-23 @ 15:44) (104 - 122)  BP: 115/74 (09-13-23 @ 15:28) (115/74 - 115/74)  RR: 18 (09-13-23 @ 15:28) (18 - 18)  SpO2: 92% (09-13-23 @ 15:44) (92% - 98%)  GEN:NAD  CV:RRR  Pulm: CTA bl  Abd soft NT gravid  FHT: 150 , mod ana, + accels, - decels   TOCO:  q5m  VE: 4/80/-2  SONO T(C): 37.3 (09-13-23 @ 15:28), Max: 37.3 (09-13-23 @ 15:28)  HR: 113 (09-13-23 @ 15:44) (104 - 122)  BP: 115/74 (09-13-23 @ 15:28) (115/74 - 115/74)  RR: 18 (09-13-23 @ 15:28) (18 - 18)  SpO2: 92% (09-13-23 @ 15:44) (92% - 98%)  GEN:NAD  CV:RRR  Pulm: CTA bl  Abd soft NT gravid  FHT: 150 , mod ana, + accels, - decels   TOCO:  q5m  VE: 4/80/-2  SONO VTX

## 2023-09-13 NOTE — OB PROVIDER H&P - HISTORY OF PRESENT ILLNESS
26yo  EDC 23 @ 40 weeks sent from the office in early labor, Pt reports increase in frequency of ctx this am and was found to be 3.5cm. Denies LOF/VB. +FM   GBS negative   EFW 3200g  PNC c/b sunchorionic hematoma which resolved  PNL:    OB: 2016 FT  6#7        2017 FT  6#13        2019 FT  7#13  GYN:Denies fibroids/ovarian cysts/STI's/abnl pap  PMH: none  PSH: none  MEDS: PNV, Fe  ALL:NKDA  Psych: Denies anxiety/depression  Accepts blood.

## 2023-09-13 NOTE — OB PROVIDER H&P - ATTENDING COMMENTS
negative...
Agree with above  Patient in early labor  4cm  Plan for epidural, pitocin, AROM  Expect     gcsallie md

## 2023-09-13 NOTE — OB PROVIDER DELIVERY SUMMARY - NSPOSITIONATDELIA_OBGYN_ALL_OB
Left v/m to rtc for test results. Cm    ----- Message from Gil House MD sent at 7/10/2019  7:39 AM CDT -----  GAYATHRI is nml     Occiput Anterior

## 2023-09-13 NOTE — OB PROVIDER H&P - NSLOWPPHRISK_OBGYN_A_OB
No previous uterine incision/Small Pregnancy/Less than or equal to 4 previous vaginal births/No known bleeding disorder/No history of postpartum hemorrhage/No other PPH risks indicated

## 2023-09-13 NOTE — OB NEONATOLOGY/PEDIATRICIAN DELIVERY SUMMARY - NSPEDSNEONOTESA_OBGYN_ALL_OB_FT
Attended Delivery Note (Pediatrics/Neonatology):  Requested to attend  delivery due to heavy meconium.  Mother is a 26 yo  at 40 weeks of gestation. Prenatal labs negative/NR/immune. Maternal Blood Type B-, s/p Rhogam x2. GBS negative from 23.   Maternal PMHx: unremarkable. Prenatal Care complicated by subchorionic hematoma at 10 weeks.  AROM at 1758 ( ~1.5 hours prior to delivery), Heavy meconium fluid.  Delivery by , Vertex presentation. Emerged vigorous. Delayed cord clamping for 60 seconds. Warmed, dried, stimulated and suctioned. APGAR 8/9. Maternal Tmax 36.5'C. EOS 0.03.   Mother wants breast feeding, desires HepB vaccine.  Pediatrician Dr. Mcdermott

## 2023-09-13 NOTE — OB RN DELIVERY SUMMARY - NSSELHIDDEN_OBGYN_ALL_OB_FT
[NS_DeliveryAttending1_OBGYN_ALL_OB_FT:[id number]=],[NS_DeliveryRN_OBGYN_ALL_OB_FT:[id number]=]

## 2023-09-14 LAB — T PALLIDUM AB TITR SER: NEGATIVE — SIGNIFICANT CHANGE UP

## 2023-09-14 RX ORDER — IBUPROFEN 200 MG
600 TABLET ORAL EVERY 6 HOURS
Refills: 0 | Status: DISCONTINUED | OUTPATIENT
Start: 2023-09-14 | End: 2023-09-15

## 2023-09-14 RX ORDER — INFLUENZA VIRUS VACCINE 15; 15; 15; 15 UG/.5ML; UG/.5ML; UG/.5ML; UG/.5ML
0.5 SUSPENSION INTRAMUSCULAR ONCE
Refills: 0 | Status: DISCONTINUED | OUTPATIENT
Start: 2023-09-14 | End: 2023-09-15

## 2023-09-14 RX ADMIN — Medication 975 MILLIGRAM(S): at 20:48

## 2023-09-14 RX ADMIN — Medication 600 MILLIGRAM(S): at 18:41

## 2023-09-14 RX ADMIN — Medication 975 MILLIGRAM(S): at 08:27

## 2023-09-14 RX ADMIN — Medication 1 TABLET(S): at 12:03

## 2023-09-14 RX ADMIN — Medication 975 MILLIGRAM(S): at 15:00

## 2023-09-14 RX ADMIN — SODIUM CHLORIDE 3 MILLILITER(S): 9 INJECTION INTRAMUSCULAR; INTRAVENOUS; SUBCUTANEOUS at 18:00

## 2023-09-14 RX ADMIN — Medication 600 MILLIGRAM(S): at 05:26

## 2023-09-14 RX ADMIN — MAGNESIUM HYDROXIDE 30 MILLILITER(S): 400 TABLET, CHEWABLE ORAL at 23:39

## 2023-09-14 RX ADMIN — Medication 600 MILLIGRAM(S): at 23:39

## 2023-09-14 RX ADMIN — Medication 600 MILLIGRAM(S): at 17:53

## 2023-09-14 RX ADMIN — Medication 975 MILLIGRAM(S): at 21:30

## 2023-09-14 RX ADMIN — Medication 600 MILLIGRAM(S): at 12:03

## 2023-09-14 RX ADMIN — Medication 975 MILLIGRAM(S): at 09:25

## 2023-09-14 RX ADMIN — Medication 600 MILLIGRAM(S): at 13:00

## 2023-09-14 NOTE — PROGRESS NOTE ADULT - ASSESSMENT
A/P: 26yo PPD#1 s/p , EBL = 250.  Patient is stable and doing well post-partum.   - Pain well controlled, continue current pain regimen  - Increase ambulation, SCDs when not ambulating  - Continue regular diet    Debbie Hess MD PGY3

## 2023-09-14 NOTE — CHART NOTE - NSCHARTNOTEFT_GEN_A_CORE
Called to bedside by RN to evaluate headache    I evaluated the patient, who states that she has had a headache and dizziness since yesterday. Described as 2/10 in severity and in the frontal region. Patient has a history of headache with aura, but states that her current headache feels different. Describes headache to be worse when sitting up, improved when laying down. No nausea, no vomiting. No vision changes.     T(C): 36.6 (14 Sep 2023 08:30), Max: 37.3 (13 Sep 2023 15:28)  T(F): 97.8 (14 Sep 2023 08:30), Max: 99.1 (13 Sep 2023 15:28)  HR: 91 (14 Sep 2023 09:00) (56 - 122)  BP: 104/72 (14 Sep 2023 09:00) (90/52 - 121/77)  BP(mean): --  RR: 18 (14 Sep 2023 09:00) (16 - 19)  SpO2: 100% (14 Sep 2023 09:00) (83% - 100%)    General appearance: Comfortable on exam  Back: Mild ecchymosis noted in lumbar region  Neuro: CN II-XII intact    Assessment and plan:  Concern for spinal headache due to positional nature of headache. Pain is mild, however patient expresses concern due to 3 attempts at placing epidural.  Will call anesthesia for evaluation.    Dr. Smith aware  Rena Garrido, PGY-1

## 2023-09-15 ENCOUNTER — TRANSCRIPTION ENCOUNTER (OUTPATIENT)
Age: 28
End: 2023-09-15

## 2023-09-15 VITALS
TEMPERATURE: 98 F | RESPIRATION RATE: 18 BRPM | SYSTOLIC BLOOD PRESSURE: 92 MMHG | DIASTOLIC BLOOD PRESSURE: 60 MMHG | HEART RATE: 60 BPM | OXYGEN SATURATION: 97 %

## 2023-09-15 PROCEDURE — 85025 COMPLETE CBC W/AUTO DIFF WBC: CPT

## 2023-09-15 PROCEDURE — 86900 BLOOD TYPING SEROLOGIC ABO: CPT

## 2023-09-15 PROCEDURE — 86780 TREPONEMA PALLIDUM: CPT

## 2023-09-15 PROCEDURE — 86901 BLOOD TYPING SEROLOGIC RH(D): CPT

## 2023-09-15 PROCEDURE — 59050 FETAL MONITOR W/REPORT: CPT

## 2023-09-15 PROCEDURE — 86850 RBC ANTIBODY SCREEN: CPT

## 2023-09-15 RX ORDER — SENNA PLUS 8.6 MG/1
1 TABLET ORAL
Refills: 0 | Status: DISCONTINUED | OUTPATIENT
Start: 2023-09-15 | End: 2023-09-15

## 2023-09-15 RX ORDER — FERROUS FUMARATE 350(115)MG
1 TABLET ORAL
Refills: 0 | DISCHARGE

## 2023-09-15 RX ORDER — POLYETHYLENE GLYCOL 3350 17 G/17G
17 POWDER, FOR SOLUTION ORAL DAILY
Refills: 0 | Status: DISCONTINUED | OUTPATIENT
Start: 2023-09-15 | End: 2023-09-15

## 2023-09-15 RX ADMIN — Medication 600 MILLIGRAM(S): at 12:05

## 2023-09-15 RX ADMIN — Medication 1 TABLET(S): at 12:05

## 2023-09-15 RX ADMIN — Medication 975 MILLIGRAM(S): at 09:12

## 2023-09-15 RX ADMIN — Medication 600 MILLIGRAM(S): at 06:09

## 2023-09-15 RX ADMIN — Medication 600 MILLIGRAM(S): at 06:32

## 2023-09-15 RX ADMIN — Medication 975 MILLIGRAM(S): at 03:00

## 2023-09-15 RX ADMIN — Medication 600 MILLIGRAM(S): at 00:11

## 2023-09-15 RX ADMIN — SENNA PLUS 1 TABLET(S): 8.6 TABLET ORAL at 06:09

## 2023-09-15 RX ADMIN — Medication 975 MILLIGRAM(S): at 02:31

## 2023-09-15 NOTE — DISCHARGE NOTE OB - PATIENT PORTAL LINK FT
You can access the FollowMyHealth Patient Portal offered by [facility] by registering at the following website: [email or web address]. By joining [employer]’s FollowMyHealth portal, you will also be able to view your health information using other applications (apps) compatible with our system.

## 2023-09-15 NOTE — PROGRESS NOTE ADULT - SUBJECTIVE AND OBJECTIVE BOX
28 yo F s/p  w/ epidural yesterday on 23.  Pt. reports onset of H/A within hour of delivery; quality of H/A described as primarily frontal, possible temporal, no occipital or nuchal component.  Denies neck stiffness, hypoacousia. Pt. does report mild photophobia, and occasional mild dizziness not interfering with gait.  Pt describes pain as mild, 2/10, when sitting/standing, improves when laying supine.  Pt reports mild low back pain w/ mild tenderness to palpation.  3 single skin punctures noted, mild (1-2 cm) area of ecchymosis without erythema/swelling; skin dry.    Epidural placement yesterday approx. 16:30 initially sitting position, pt. had difficulty achieving desired (kyphotic) curvature for placement.  Two single attempts at L3-4/L, L4-5 unable to adequately engage ligament with touhy needle. No heme or CSF noted during attempts.  Pt. repositioned L lateral decubitus position, re-prepped and draped with single and uneventful midline placement at L3-4 without heme or CSF encountered.  Adequate epidural analgesia in labor was achieved.    Impression: have discussed in detail with pt and  that although no CSF was visibly identified, the possibility of dural breach cannot be ruled out.  Pt's symptoms are positional as noted, and therefore may represent spinal fluid leak resulting in post dural puncture headache (PDPH).  The patient does admit that her current symptoms could possibly be consistent with her chronic history of migraine HA's.   Treatment modalities for PDPH including conservative measures (bedrest, hydration, caffeine, mild analgesics) versus epidural blood patch (EBP) were discussed in detail. Risks of epidural blood patch include but not limited to dural puncture with worsening of headache, nerve injury and infection/abscess requiring surgical drainage.    Because patient's symptoms are mild possibly consistent with a limited or partial dural penetration with mild CSF leak, it would be prudent at this time to pursue conservative measures as above. The patient does not feel that her current symptoms will interfere either with her own ADL's, or care of the infant.  The patient and  are in agreement that they would prefer at this time to treat conservatively rather than with EBP.  I have provided the patient with contact numbers to discuss matter with either myself or the on-call anesthesiologist should symptoms worsen or if further treatment is indicated.     
OB Progress Note:  PPD#1    S: 26yo  PPD#1 s/p . Patient feels well. Pain is well controlled. She is tolerating a regular diet and passing flatus. She is voiding spontaneously, and ambulating without difficulty. Denies CP/SOB. Denies lightheadedness/dizziness. Denies N/V.    O:  Vitals:  Vital Signs Last 24 Hrs  T(C): 36.6 (13 Sep 2023 23:15), Max: 37.3 (13 Sep 2023 15:28)  T(F): 97.9 (13 Sep 2023 23:15), Max: 99.1 (13 Sep 2023 15:28)  HR: 70 (13 Sep 2023 23:15) (56 - 122)  BP: 94/60 (13 Sep 2023 23:15) (90/52 - 121/77)  BP(mean): --  RR: 18 (13 Sep 2023 23:15) (16 - 19)  SpO2: 98% (13 Sep 2023 23:15) (83% - 100%)    Parameters below as of 13 Sep 2023 23:15  Patient On (Oxygen Delivery Method): room air        MEDICATIONS  (STANDING):  acetaminophen     Tablet .. 975 milliGRAM(s) Oral <User Schedule>  diphtheria/tetanus/pertussis (acellular) Vaccine (Adacel) 0.5 milliLiter(s) IntraMuscular once  ibuprofen  Tablet. 600 milliGRAM(s) Oral every 6 hours  influenza   Vaccine 0.5 milliLiter(s) IntraMuscular once  oxytocin Infusion 41.667 milliUNIT(s)/Min (125 mL/Hr) IV Continuous <Continuous>  prenatal multivitamin 1 Tablet(s) Oral daily  sodium chloride 0.9% lock flush 3 milliLiter(s) IV Push every 8 hours      Labs:  Blood type: B Negative  Rubella IgG: RPR:                           11.4<L>   9.47 >-----------< 219    (  @ 16:32 )             34.6                  Physical Exam:  General: NAD  Abdomen: soft, non-tender, non-distended, fundus firm  Vaginal: Lochia wnl  Extremities: No erythema/edema  
S: Patient is doing well. Having mild headache and back pain. Denies any n/v. Tolerates regular diet. She states lochia is in WNL. Ambulating without difficulty. Denies N/V. Voiding freely. Passing flatus. Pain well controlled with oral pain medications. Denies CP/SOB, F/C/S.    O: Vital Signs Last 24 Hrs  T(C): 36.9 (15 Sep 2023 09:07), Max: 36.9 (15 Sep 2023 09:07)  T(F): 98.4 (15 Sep 2023 09:07), Max: 98.4 (15 Sep 2023 09:07)  HR: 60 (15 Sep 2023 09:07) (55 - 91)  BP: 92/60 (15 Sep 2023 09:07) (92/60 - 116/72)  BP(mean): --  RR: 18 (15 Sep 2023 09:07) (18 - 18)  SpO2: 97% (15 Sep 2023 09:07) (97% - 98%)    Parameters below as of 15 Sep 2023 09:07  Patient On (Oxygen Delivery Method): room air        Physical Exam:  General: NAD  Abdomen: soft, non-tender, non-distended, fundus firm  Vaginal: deferred  Ext: NTBL    Labs:             11.4   9.47  )-----------( 219      ( 09-13 @ 16:32 )             34.6                   MEDICATIONS  (STANDING):  acetaminophen     Tablet .. 975 milliGRAM(s) Oral <User Schedule>  diphtheria/tetanus/pertussis (acellular) Vaccine (Adacel) 0.5 milliLiter(s) IntraMuscular once  ibuprofen  Tablet. 600 milliGRAM(s) Oral every 6 hours  influenza   Vaccine 0.5 milliLiter(s) IntraMuscular once  oxytocin Infusion 41.667 milliUNIT(s)/Min (125 mL/Hr) IV Continuous <Continuous>  prenatal multivitamin 1 Tablet(s) Oral daily  sodium chloride 0.9% lock flush 3 milliLiter(s) IV Push every 8 hours

## 2023-09-15 NOTE — DISCHARGE NOTE OB - CARE PLAN
1 Principal Discharge DX:	 (normal spontaneous vaginal delivery)  Assessment and plan of treatment:	reg diet, ambulating, pain control

## 2023-09-15 NOTE — DISCHARGE NOTE OB - CARE PROVIDER_API CALL
Payton Carter  Obstetrics and Gynecology  [address] [place] [address], [address]  [place] [address]  Phone: [phone]  Fax: [phone]  Follow Up Time:

## 2023-09-15 NOTE — PROGRESS NOTE ADULT - PROBLEM SELECTOR PLAN 1
Reg diet  ambulating  pain control  continue tylenol and motrin for headache -- also discussed caffeine, magnesium supplmenets and hydration  If worsens patient to call office  otherwise clear for discharge    sheila hinton

## 2023-09-20 NOTE — OB NEONATOLOGY/PEDIATRICIAN DELIVERY SUMMARY - NS AS TEMP FAHRENHEIT (PEDS/INFANT) CAL BABY_A
09/20/23      Ewelina March         To whom it may concern,      Ewelina is a patient of mine who was seen in the office today September 20, 2023 and received treatment. Please excuse her absence from work for today. For any questions or concerns, please call the office at the number listed below.           Sincerely,       Cynthia William MD  [place] [id number]  Phone: [phone]                
97.7

## 2023-10-20 ENCOUNTER — APPOINTMENT (OUTPATIENT)
Dept: OBGYN | Facility: CLINIC | Age: 28
End: 2023-10-20
Payer: SELF-PAY

## 2023-10-20 PROCEDURE — 0503F POSTPARTUM CARE VISIT: CPT

## 2023-10-20 PROCEDURE — 36415 COLL VENOUS BLD VENIPUNCTURE: CPT

## 2024-09-18 NOTE — OB RN DELIVERY SUMMARY - NS_SEPSISRSKCALC_OBGYN_ALL_OB_FT
Next OV 1/27/25   EOS calculated successfully. EOS Risk Factor: 0.5/1000 live births ([facility] national incidence); GA=40w;Temp=99.1; ROM=1.217; GBS='Negative'; Antibiotics='No antibiotics or any antibiotics < 2 hrs prior to birth'

## 2025-02-25 ENCOUNTER — APPOINTMENT (OUTPATIENT)
Dept: OBGYN | Facility: CLINIC | Age: 30
End: 2025-02-25
Payer: SELF-PAY

## 2025-02-25 PROCEDURE — 96127 BRIEF EMOTIONAL/BEHAV ASSMT: CPT

## 2025-02-25 PROCEDURE — 99459 PELVIC EXAMINATION: CPT

## 2025-02-25 PROCEDURE — 99395 PREV VISIT EST AGE 18-39: CPT

## 2025-03-12 NOTE — PRE-ANESTHESIA EVALUATION ADULT - MALLAMPATI CLASS
Brief Postoperative Note      Patient: Aliyah Bradley  YOB: 1970  MRN: [id number]    Date of Procedure: 3/12/[employer] portal or [employer] phone tawanna 
Class III - visualization of the soft palate and the base of the uvula